# Patient Record
Sex: FEMALE | Race: BLACK OR AFRICAN AMERICAN | NOT HISPANIC OR LATINO | URBAN - NONMETROPOLITAN AREA
[De-identification: names, ages, dates, MRNs, and addresses within clinical notes are randomized per-mention and may not be internally consistent; named-entity substitution may affect disease eponyms.]

---

## 2020-07-16 ENCOUNTER — IMPORTED ENCOUNTER (OUTPATIENT)
Dept: URBAN - NONMETROPOLITAN AREA CLINIC 1 | Facility: CLINIC | Age: 55
End: 2020-07-16

## 2020-07-16 PROBLEM — H52.223: Noted: 2020-07-16

## 2020-07-16 PROBLEM — H52.4: Noted: 2020-07-16

## 2020-07-16 PROBLEM — H52.03: Noted: 2020-07-16

## 2020-07-16 PROCEDURE — 92004 COMPRE OPH EXAM NEW PT 1/>: CPT

## 2020-07-16 PROCEDURE — 92015 DETERMINE REFRACTIVE STATE: CPT

## 2020-07-16 NOTE — PATIENT DISCUSSION
Hyperopia/Astigmatism/Presbyopia OUDiscusseed refractive status in detail with patient. New glasses Rx given today. Will order CL Trials and have patient return for fitting. Continue to monitor. Glaucoma Suspect OUDiscussed diagnosis in detail with patient. Positive family history of disease (grandmother). IOP at 26 OU. Cup to disc noted 0.7 OU. Recommend further testing but patient wishes to see her regular eye doctor in [place]. Continue to monitor.

## 2020-07-30 ENCOUNTER — IMPORTED ENCOUNTER (OUTPATIENT)
Dept: URBAN - NONMETROPOLITAN AREA CLINIC 1 | Facility: CLINIC | Age: 55
End: 2020-07-30

## 2020-07-30 PROCEDURE — 92310 CONTACT LENS FITTING OU: CPT

## 2020-07-30 NOTE — PATIENT DISCUSSION
Hyperopia/Astigmatism/Presbyopia OUDiscusseed refractive status in detail with patient. New CL Rx given today. Discussed proper lens care replacement and hygiene. Continue to monitor. Glaucoma Suspect OUDiscussed diagnosis in detail with patient. Positive family history of disease (grandmother). IOP at 26 OU. Cup to disc noted 0.7 OU. Recommend further testing but patient wishes to see her regular eye doctor in [place]. Continue to monitor.

## 2022-04-09 ASSESSMENT — KERATOMETRY
OS_K1POWER_DIOPTERS: 43.25
OD_AXISANGLE_DEGREES: 075
OS_AXISANGLE_DEGREES: 104
OD_K1POWER_DIOPTERS: 42.75
OS_K2POWER_DIOPTERS: 42.25
OD_K2POWER_DIOPTERS: 42.25

## 2022-04-09 ASSESSMENT — VISUAL ACUITY
OS_SC: 20/20
OD_SC: 20/20
OD_SC: 20/20
OS_SC: 20/20

## 2022-04-09 ASSESSMENT — TONOMETRY
OD_IOP_MMHG: 26
OS_IOP_MMHG: 26